# Patient Record
Sex: MALE | Race: WHITE | Employment: UNEMPLOYED | URBAN - METROPOLITAN AREA
[De-identification: names, ages, dates, MRNs, and addresses within clinical notes are randomized per-mention and may not be internally consistent; named-entity substitution may affect disease eponyms.]

---

## 2022-03-15 RX ORDER — DONEPEZIL HYDROCHLORIDE 10 MG/1
10 TABLET, ORALLY DISINTEGRATING ORAL NIGHTLY
COMMUNITY

## 2022-03-15 RX ORDER — MEMANTINE HYDROCHLORIDE 5 MG/1
5 TABLET ORAL 2 TIMES DAILY
COMMUNITY

## 2022-03-15 RX ORDER — FINASTERIDE 5 MG/1
5 TABLET, FILM COATED ORAL DAILY
COMMUNITY

## 2022-03-15 RX ORDER — LORATADINE 10 MG/1
10 TABLET ORAL DAILY
COMMUNITY

## 2022-03-15 RX ORDER — ACETAMINOPHEN 160 MG
2 TABLET,DISINTEGRATING ORAL DAILY
COMMUNITY

## 2022-03-20 ENCOUNTER — ANESTHESIA EVENT (OUTPATIENT)
Dept: OPERATING ROOM | Age: 87
End: 2022-03-20
Payer: MEDICARE

## 2022-03-20 NOTE — ANESTHESIA PRE PROCEDURE
Department of Anesthesiology  Preprocedure Note       Name:  Rafia Orozco   Age:  81 y.o.  :  1935                                          MRN:  [id number]         Date:  3/20/2022      Surgeon: Zen Acuna):  Conception Po, DO    Procedure: Procedure(s):  PAIN PUMP REPLACEMENT DUE TO APPROACHING END OF LIFE **PAIN PUMP LEFT LOWER ABDOMEN**    Medications prior to admission:   Prior to Admission medications    Medication Sig Start Date End Date Taking? Authorizing Provider   ALBUTEROL IN Inhale into the lungs as needed   Yes Historical Provider, MD   Cholecalciferol (VITAMIN D3) 50 MCG (2000) CAPS Take 2 capsules by mouth daily   Yes Historical Provider, MD   Cyanocobalamin (VITAMIN B-12 SL) Place 1,000 mcg under the tongue daily   Yes Historical Provider, MD   donepezil (ARICEPT ODT) 10 MG disintegrating tablet Take 10 mg by mouth nightly   Yes Historical Provider, MD   finasteride (PROSCAR) 5 MG tablet Take 5 mg by mouth daily   Yes Historical Provider, MD   loratadine (CLARITIN) 10 MG tablet Take 10 mg by mouth daily   Yes Historical Provider, MD   memantine (NAMENDA) 5 MG tablet Take 5 mg by mouth 2 times daily   Yes Historical Provider, MD   metoprolol tartrate (LOPRESSOR) 25 MG tablet Take 25 mg by mouth 2 times daily Take 1/2 tab   Yes Historical Provider, MD   NONFORMULARY MORPHINE PAIN PUMP   Yes Historical Provider, MD   tamsulosin (FLOMAX) 0.4 MG capsule Take 0.4 mg by mouth daily Take 2 caps   Yes Historical Provider, MD   aspirin 81 MG tablet Take 81 mg by mouth every other day Stopped 6 days pre op   Yes Historical Provider, MD   omeprazole (PRILOSEC) 20 MG capsule Take 20 mg by mouth daily. Yes Historical Provider, MD   levetiracetam (KEPPRA) 500 MG tablet Take 500 mg by mouth 2 times daily    Yes Historical Provider, MD       Current medications:    No current facility-administered medications for this encounter.      Current Outpatient Medications   Medication Sig Dispense Refill  ALBUTEROL IN Inhale into the lungs as needed      Cholecalciferol (VITAMIN D3) 50 MCG (2000 UT) CAPS Take 2 capsules by mouth daily      Cyanocobalamin (VITAMIN B-12 SL) Place 1,000 mcg under the tongue daily      donepezil (ARICEPT ODT) 10 MG disintegrating tablet Take 10 mg by mouth nightly      finasteride (PROSCAR) 5 MG tablet Take 5 mg by mouth daily      loratadine (CLARITIN) 10 MG tablet Take 10 mg by mouth daily      memantine (NAMENDA) 5 MG tablet Take 5 mg by mouth 2 times daily      metoprolol tartrate (LOPRESSOR) 25 MG tablet Take 25 mg by mouth 2 times daily Take 1/2 tab      NONFORMULARY MORPHINE PAIN PUMP      tamsulosin (FLOMAX) 0.4 MG capsule Take 0.4 mg by mouth daily Take 2 caps      aspirin 81 MG tablet Take 81 mg by mouth every other day Stopped 6 days pre op      omeprazole (PRILOSEC) 20 MG capsule Take 20 mg by mouth daily. 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   no hospitalization    Hyperlipidemia     Hypertension     Pain     sternum    Pityriasis rosea     Seizure ([facility] [address]     DX with epilepsy 2008  on keppra       Past Surgical History:        Procedure Laterality Date    BACK SURGERY      lumbar laminectomy    CHOLECYSTECTOMY      COLONOSCOPY      ELBOW SURGERY Right     reconstruction    ENDOSCOPY, COLON, DIAGNOSTIC      HAND SURGERY      bilateral    fingers    HERNIA REPAIR      x2    KNEE ARTHROSCOPY Right     OTHER SURGICAL HISTORY  01-16-13    stage 1, 5 day percutaneous trial thoracic spinal cord stimulator    OTHER SURGICAL HISTORY  04 24 2013    surgical implant medtronic thoracic spinal cord stimulator electrode and generator    OTHER SURGICAL HISTORY N/A 8/17/2015    stage 1 pump trial      OTHER SURGICAL HISTORY  10 7 15    surgical implantation of medtronic pain pump and catheter    OTHER SURGICAL HISTORY      Removal of spinal cord stimulator       Social History:    Social History     Tobacco Use    Smoking status: Never Smoker    Smokeless tobacco: Never Used   Substance Use Topics    Alcohol use: Yes     Comment: occas beer                                Counseling given: Not Answered      Vital Signs (Current):   Vitals:    03/15/22 1049   Weight: 161 lb (73 kg)   Height: 5' 7\" (1.702 m)                                              BP Readings from Last 3 Encounters:   08/11/16 130/85   10/07/15 143/90   08/17/15 132/82       NPO Status:                                                                                 BMI:   Wt Readings from Last 3 Encounters:   08/11/16 139 lb 11.2 oz (63.4 kg)   10/07/15 145 lb (65.8 kg)   09/29/15 155 lb (70.3 kg)     Body mass index is 25.22 kg/m². CBC:   Lab Results   Component Value Date    WBC 5.2 04/20/2013    RBC 5.23 04/20/2013    HGB 16.1 04/20/2013    HCT 47.5 04/20/2013    MCV 90.7 04/20/2013    RDW 12.9 04/20/2013     04/20/2013       CMP:   Lab Results   Component Value Date     04/20/2013    K 5.4 04/20/2013     04/20/2013    CO2 24 04/20/2013    BUN 15 04/20/2013    CREATININE 0.9 04/20/2013    LABGLOM >60 04/20/2013    GLUCOSE 132 04/20/2013    CALCIUM 10.0 04/20/2013       POC Tests: No results for input(s): POCGLU, POCNA, POCK, POCCL, POCBUN, POCHEMO, POCHCT in the last 72 hours.     Coags:   Lab Results   Component Value Date    PROTIME 12.3 04/20/2013    INR 1.0 04/20/2013    APTT 33.1 04/20/2013       HCG (If Applicable): No results found for: PREGTESTUR, PREGSERUM, HCG, HCGQUANT     ABGs: No results found for: PHART, PO2ART, KSS4RQE, FEM3ECQ, BEART, T7ESLKOG     Type & Screen (If Applicable):  No results found for: LABABO, LABRH    Drug/Infectious Status (If Applicable):  No results found for: HIV, HEPCAB    COVID-19 Screening (If Applicable): No results found for: COVID19        Anesthesia Evaluation  Patient summary reviewed  Airway: Mallampati: III  TM distance: >3 FB   Neck ROM: full  Mouth opening: > = 3 FB Dental:      Comment: Extensive dental work with multiple crowns and capped teeth. Pulmonary: breath sounds clear to auscultation                             Cardiovascular:    (+) hypertension:, CAD:, hyperlipidemia        Rhythm: regular  Rate: normal                    Neuro/Psych:   (+) seizures ( Seizure ([address] DX with epilepsy 2008 on keppra ):, neuromuscular disease ( Lumbar post-laminectomy syndrome):,             GI/Hepatic/Renal:   (+) GERD:,           Endo/Other: Negative Endo/Other ROS                    Abdominal:             Vascular:   + PVD, aortic or cerebral ( Carotid stenosis), . Other Findings:           Anesthesia Plan      MAC     ASA 3       Induction: intravenous. Anesthetic plan and risks discussed with patient. Plan discussed with CRNA.                   Ky Diaz MD   9-25-13

## 2022-03-23 ENCOUNTER — ANESTHESIA (OUTPATIENT)
Dept: OPERATING ROOM | Age: 87
End: 2022-03-23
Payer: MEDICARE

## 2022-03-23 ENCOUNTER — HOSPITAL ENCOUNTER (OUTPATIENT)
Dept: OPERATING ROOM | Age: 87
Setting detail: OUTPATIENT SURGERY
Discharge: HOME | End: 2022-03-23
Attending: ANESTHESIOLOGY
Payer: MEDICARE

## 2022-03-23 ENCOUNTER — HOSPITAL ENCOUNTER (OUTPATIENT)
Age: 87
Setting detail: OUTPATIENT SURGERY
Discharge: HOME | End: 2022-03-23
Attending: ANESTHESIOLOGY | Admitting: ANESTHESIOLOGY
Payer: MEDICARE

## 2022-03-23 VITALS
RESPIRATION RATE: 16 BRPM | HEIGHT: 67 IN | BODY MASS INDEX: 24.17 KG/M2 | TEMPERATURE: 97.9 F | HEART RATE: 65 BPM | DIASTOLIC BLOOD PRESSURE: 94 MMHG | SYSTOLIC BLOOD PRESSURE: 139 MMHG | WEIGHT: 154 LBS | OXYGEN SATURATION: 100 %

## 2022-03-23 VITALS
SYSTOLIC BLOOD PRESSURE: 160 MMHG | DIASTOLIC BLOOD PRESSURE: 97 MMHG | RESPIRATION RATE: 22 BRPM | OXYGEN SATURATION: 100 %

## 2022-03-23 DIAGNOSIS — R52 PAIN: ICD-10-CM

## 2022-03-23 DIAGNOSIS — G89.18 ACUTE POST-OPERATIVE PAIN: ICD-10-CM

## 2022-03-23 DIAGNOSIS — M96.1 LUMBAR POST-LAMINECTOMY SYNDROME: Primary | Chronic | ICD-10-CM

## 2022-03-23 PROBLEM — Z98.890 STATUS POST INSERTION OF INTRATHECAL PUMP: Chronic | Status: ACTIVE | Noted: 2022-03-23

## 2022-03-23 PROCEDURE — 6360000002 HC RX W HCPCS: Performed by: NURSE ANESTHETIST, CERTIFIED REGISTERED

## 2022-03-23 PROCEDURE — C1755 CATHETER, INTRASPINAL: HCPCS | Performed by: ANESTHESIOLOGY

## 2022-03-23 PROCEDURE — 3600000015 HC SURGERY LEVEL 5 ADDTL 15MIN: Performed by: ANESTHESIOLOGY

## 2022-03-23 PROCEDURE — 6370000000 HC RX 637 (ALT 250 FOR IP)

## 2022-03-23 PROCEDURE — 6360000002 HC RX W HCPCS: Performed by: ANESTHESIOLOGY

## 2022-03-23 PROCEDURE — 7100000010 HC PHASE II RECOVERY - FIRST 15 MIN: Performed by: ANESTHESIOLOGY

## 2022-03-23 PROCEDURE — 2709999900 HC NON-CHARGEABLE SUPPLY: Performed by: ANESTHESIOLOGY

## 2022-03-23 PROCEDURE — 3700000000 HC ANESTHESIA ATTENDED CARE: Performed by: ANESTHESIOLOGY

## 2022-03-23 PROCEDURE — 3209999900 FLUORO FOR SURGICAL PROCEDURES

## 2022-03-23 PROCEDURE — C1772 INFUSION PUMP, PROGRAMMABLE: HCPCS | Performed by: ANESTHESIOLOGY

## 2022-03-23 PROCEDURE — 2500000003 HC RX 250 WO HCPCS: Performed by: ANESTHESIOLOGY

## 2022-03-23 PROCEDURE — 2500000003 HC RX 250 WO HCPCS: Performed by: NURSE ANESTHETIST, CERTIFIED REGISTERED

## 2022-03-23 PROCEDURE — 2580000003 HC RX 258: Performed by: ANESTHESIOLOGY

## 2022-03-23 PROCEDURE — 3600000005 HC SURGERY LEVEL 5 BASE: Performed by: ANESTHESIOLOGY

## 2022-03-23 PROCEDURE — 3700000001 HC ADD 15 MINUTES (ANESTHESIA): Performed by: ANESTHESIOLOGY

## 2022-03-23 PROCEDURE — 7100000011 HC PHASE II RECOVERY - ADDTL 15 MIN: Performed by: ANESTHESIOLOGY

## 2022-03-23 PROCEDURE — A4217 STERILE WATER/SALINE, 500 ML: HCPCS | Performed by: ANESTHESIOLOGY

## 2022-03-23 PROCEDURE — 6360000002 HC RX W HCPCS

## 2022-03-23 DEVICE — PUMP INFUSION 20 CC IMPL DRUG SYNCHROMED II: Type: IMPLANTABLE DEVICE | Site: ABDOMEN | Status: FUNCTIONAL

## 2022-03-23 DEVICE — CATHETER INTRATRACHEAL 86 CM ASCENDA: Type: IMPLANTABLE DEVICE | Site: BACK | Status: FUNCTIONAL

## 2022-03-23 RX ORDER — LIDOCAINE HYDROCHLORIDE 10 MG/ML
INJECTION, SOLUTION EPIDURAL; INFILTRATION; INTRACAUDAL; PERINEURAL PRN
Status: DISCONTINUED | OUTPATIENT
Start: 2022-03-23 | End: 2022-03-23 | Stop reason: ALTCHOICE

## 2022-03-23 RX ORDER — OXYCODONE HYDROCHLORIDE AND ACETAMINOPHEN 5; 325 MG/1; MG/1
1 TABLET ORAL EVERY 4 HOURS PRN
Status: DISCONTINUED | OUTPATIENT
Start: 2022-03-23 | End: 2022-03-23 | Stop reason: HOSPADM

## 2022-03-23 RX ORDER — EPHEDRINE SULFATE/0.9% NACL/PF 50 MG/5 ML
SYRINGE (ML) INTRAVENOUS PRN
Status: DISCONTINUED | OUTPATIENT
Start: 2022-03-23 | End: 2022-03-23 | Stop reason: SDUPTHER

## 2022-03-23 RX ORDER — FENTANYL CITRATE 50 UG/ML
INJECTION, SOLUTION INTRAMUSCULAR; INTRAVENOUS PRN
Status: DISCONTINUED | OUTPATIENT
Start: 2022-03-23 | End: 2022-03-23 | Stop reason: SDUPTHER

## 2022-03-23 RX ORDER — LIDOCAINE HYDROCHLORIDE 20 MG/ML
INJECTION, SOLUTION INTRAVENOUS PRN
Status: DISCONTINUED | OUTPATIENT
Start: 2022-03-23 | End: 2022-03-23 | Stop reason: SDUPTHER

## 2022-03-23 RX ORDER — PROPOFOL 10 MG/ML
INJECTION, EMULSION INTRAVENOUS CONTINUOUS PRN
Status: DISCONTINUED | OUTPATIENT
Start: 2022-03-23 | End: 2022-03-23 | Stop reason: SDUPTHER

## 2022-03-23 RX ORDER — OXYCODONE HYDROCHLORIDE AND ACETAMINOPHEN 5; 325 MG/1; MG/1
TABLET ORAL
Status: COMPLETED
Start: 2022-03-23 | End: 2022-03-23

## 2022-03-23 RX ORDER — CEPHALEXIN 500 MG/1
500 CAPSULE ORAL 3 TIMES DAILY
Qty: 30 CAPSULE | Refills: 0 | Status: SHIPPED | OUTPATIENT
Start: 2022-03-23 | End: 2022-04-02

## 2022-03-23 RX ORDER — SODIUM CHLORIDE, SODIUM LACTATE, POTASSIUM CHLORIDE, CALCIUM CHLORIDE 600; 310; 30; 20 MG/100ML; MG/100ML; MG/100ML; MG/100ML
INJECTION, SOLUTION INTRAVENOUS CONTINUOUS
Status: DISCONTINUED | OUTPATIENT
Start: 2022-03-23 | End: 2022-03-23 | Stop reason: HOSPADM

## 2022-03-23 RX ORDER — OXYCODONE HYDROCHLORIDE AND ACETAMINOPHEN 5; 325 MG/1; MG/1
1 TABLET ORAL EVERY 6 HOURS PRN
Qty: 20 TABLET | Refills: 0 | Status: SHIPPED | OUTPATIENT
Start: 2022-03-23 | End: 2022-03-28

## 2022-03-23 RX ADMIN — HYDROMORPHONE HYDROCHLORIDE 0.25 MG: 1 INJECTION, SOLUTION INTRAMUSCULAR; INTRAVENOUS; SUBCUTANEOUS at 12:29

## 2022-03-23 RX ADMIN — FENTANYL CITRATE 25 MCG: 50 INJECTION, SOLUTION INTRAMUSCULAR; INTRAVENOUS at 11:36

## 2022-03-23 RX ADMIN — OXYCODONE HYDROCHLORIDE AND ACETAMINOPHEN 1 TABLET: 5; 325 TABLET ORAL at 12:27

## 2022-03-23 RX ADMIN — PROPOFOL 75 MCG/KG/MIN: 10 INJECTION, EMULSION INTRAVENOUS at 09:50

## 2022-03-23 RX ADMIN — FENTANYL CITRATE 25 MCG: 50 INJECTION, SOLUTION INTRAMUSCULAR; INTRAVENOUS at 11:50

## 2022-03-23 RX ADMIN — HYDROMORPHONE HYDROCHLORIDE 0.25 MG: 1 INJECTION, SOLUTION INTRAMUSCULAR; INTRAVENOUS; SUBCUTANEOUS at 12:35

## 2022-03-23 RX ADMIN — FENTANYL CITRATE 50 MCG: 50 INJECTION, SOLUTION INTRAMUSCULAR; INTRAVENOUS at 09:38

## 2022-03-23 RX ADMIN — FENTANYL CITRATE 25 MCG: 50 INJECTION, SOLUTION INTRAMUSCULAR; INTRAVENOUS at 10:34

## 2022-03-23 RX ADMIN — LIDOCAINE HYDROCHLORIDE 20 MG: 20 INJECTION, SOLUTION INTRAVENOUS at 09:48

## 2022-03-23 RX ADMIN — Medication 5 MG: at 10:33

## 2022-03-23 RX ADMIN — OXYCODONE AND ACETAMINOPHEN 1 TABLET: 5; 325 TABLET ORAL at 12:27

## 2022-03-23 RX ADMIN — FENTANYL CITRATE 25 MCG: 50 INJECTION, SOLUTION INTRAMUSCULAR; INTRAVENOUS at 09:55

## 2022-03-23 RX ADMIN — FENTANYL CITRATE 50 MCG: 50 INJECTION, SOLUTION INTRAMUSCULAR; INTRAVENOUS at 09:50

## 2022-03-23 RX ADMIN — SODIUM CHLORIDE, POTASSIUM CHLORIDE, SODIUM LACTATE AND CALCIUM CHLORIDE: 600; 310; 30; 20 INJECTION, SOLUTION INTRAVENOUS at 08:55

## 2022-03-23 RX ADMIN — CEFAZOLIN SODIUM 2000 MG: 1 POWDER, FOR SOLUTION INTRAMUSCULAR; INTRAVENOUS at 09:41

## 2022-03-23 ASSESSMENT — PAIN DESCRIPTION - PAIN TYPE
TYPE: CHRONIC PAIN;SURGICAL PAIN
TYPE: SURGICAL PAIN
TYPE: SURGICAL PAIN
TYPE: SURGICAL PAIN;CHRONIC PAIN
TYPE: SURGICAL PAIN
TYPE: SURGICAL PAIN

## 2022-03-23 ASSESSMENT — PAIN DESCRIPTION - LOCATION
LOCATION: BACK
LOCATION: BACK
LOCATION: ABDOMEN;BACK
LOCATION: BACK;ABDOMEN
LOCATION: BACK

## 2022-03-23 ASSESSMENT — PAIN SCALES - GENERAL
PAINLEVEL_OUTOF10: 10
PAINLEVEL_OUTOF10: 4
PAINLEVEL_OUTOF10: 10

## 2022-03-23 ASSESSMENT — PAIN - FUNCTIONAL ASSESSMENT: PAIN_FUNCTIONAL_ASSESSMENT: 0-10

## 2022-03-23 ASSESSMENT — PAIN DESCRIPTION - DESCRIPTORS
DESCRIPTORS: SPASM;SHARP
DESCRIPTORS: ACHING

## 2022-03-23 ASSESSMENT — PAIN DESCRIPTION - PROGRESSION
CLINICAL_PROGRESSION: GRADUALLY IMPROVING
CLINICAL_PROGRESSION: GRADUALLY IMPROVING

## 2022-03-23 ASSESSMENT — PAIN DESCRIPTION - FREQUENCY: FREQUENCY: INTERMITTENT

## 2022-03-23 NOTE — PROGRESS NOTES
Patient incontinent of large amount of urine. Patient uncooperative with care. patient cleansed and clean linen and gown. Up to chair. Binder on .

## 2022-03-23 NOTE — PROGRESS NOTES
Spoke with Usha Tom RN who stated Dr. Oliver Milligan needed to call Dr. Segundo Chauhan if needs further work up. Dr. Oliver Milligan spoke with the daughter who states this pain has been an on going issue with this upper abdomen/chest pain.

## 2022-03-23 NOTE — OP NOTE
[address]                                OPERATIVE REPORT    PATIENT NAME: Rosemary Keys                   :        1935  MED REC NO:   [id number]                            ROOM:  ACCOUNT NO:   [de-identified]                           ADMIT DATE: 2022  PROVIDER:     Donavan Underwood DO    DATE OF PROCEDURE:  2022    PREPROCEDURE DIAGNOSES:  1. Chronic pain syndrome, G89.4. 2.  Post lumbar laminectomy syndrome M96.1, status post subarachnoid  pain pump with dying battery possible catheter occlusion. POSTOPERATIVE DIAGNOSES:  1. Chronic pain syndrome, G89.4. 2.  Post lumbar laminectomy syndrome M96.1, status post subarachnoid  pain pump with dying battery possible catheter occlusion. 3.  Catheter occlusion. PROCEDURES:  1. Implant pain pump generator Medtronic. 2.  Surgical implantation of Medtronic pain pump catheter. 3.  Surgical removal of old pain pump generator with dying battery. 4.  Removal of pain pump catheter that is occluded. 5.  Refill, analyze and reprogram pain pump in surgery with physician. 6.  Direct x-ray guidance of the spine. SURGEON:  Donavan Underwood DO    COMPLICATIONS:  None. ASSISTANTS:  None. BLOOD LOSS:  Less than 30 mL. ANESTHESIA:  IV Anesthesia per Department of Anesthesia, local per Dr. Liza Funes. INDICATIONS:  The patient comes in today, 2022 to the [address] [place] [address] Operating Room #4 via the [address] [place] [address] for  the above surgical procedure. The patient is an 81-year-old white male who suffers with chronic pain  and has so for many years. He failed to respond to conservative care  including oral medications, rehabilitation, physical therapy,  therapeutic injections, subsequent back surgery on multiple occasions.     The patient subsequently underwent successful implantation of Medtronic  subarachnoid pain pump that has helped him with his pain and he has done  really well up until recently when he began to notice some breakthrough  pain. He is in assisted living and the concern was whether or not he is  truly getting all his medicine and/or is it the pump dying. Computerized analysis of the pump does show the battery is at  end-of-life and thus we will replace the pain pump today. The catheter  may have to be replaced if we cannot get CSF back out of the catheter. The patient and I along with the family were explained in details risk,  potential complications and alternatives to care and he did request that  we proceed as outlined above as did the family. The patient was brought to operating room #4 at the [facility], placed in the right lateral recumbent position so we have access  to the pain pump in the left lower quadrant of his abdomen as well as to  his back if we have to replace the catheter. It should be noted that the patient did receive IV antibiotics  preoperatively. Please see the medical record. The patient was seen by the Department of Anesthesia and IV anesthetic  is required. Please see the anesthesia record. The patient was prepped front to back, table to table, xiphoid to pubis  in the usual manner including full surgical sterile technique utilized  throughout. X-rays were taken to identify the anchor site for the catheter as well  as pain pump course and marked on the skin. It should be noted that the catheter was at the vertebral body of T9. We started over the battery site localizing with 10 mL of 1% Xylocaine  with a 25 gauge 1.5-inch disposable needle. We then reopened the old incision, the pain pump itself because the  patient has had a recent weight loss has sunken quite a bit as his  muscles are weak and debilitated and the patient asked that we move the  pump more cephalad and so our incision was cephalad of the old incision.     We then used careful blunt dissection down to the pain pump itself and  we were able to identify with ease and removed it without difficulty. The medicine in the old pain pump was then placed in the new pain pump. Please see the computerized printout on the chart. The patient's pain pump catheter; however, was not draining any CSF or  medication from the catheter and we attempted to aspirate with a TB  syringe medicine or anything out of the pump catheter without success  and we were unable to get the catheter to drain passively. As a result,  a decision was made to replace the catheter in order to assure that the  catheter is patent and the patient is getting the medicine he requires. The left lower quadrant abdominal incision was irrigated with antibiotic  solution, checked for strict hemostasis and the pain pump was sewn into  the pocket using 1-0 anchor stitches Ti-Cron at all four quadrants with  a refill port facing outward. We then irrigated with antibiotic solution and packed with 4x4 soaked in  antibiotic solution in the wound. We then turned our attention to the back incision which was identified  and localized using 10 mL of 1% Xylocaine with 25-gauge 1.5-inch  disposable needle. We then reopened the old back incision, used careful blunt dissection  down to the anchor which was identified and removed intact along with  anchor stitches. The catheter came out without resistance and intact  and on examination the tip did not have any foreign bodies or granuloma;  however, when we did remove the catheter from the patient's back we had  no passive clear CSF. The patient may just be dehydrated as he is 81 years old and in assisted-living.     In either regard this means we will have to replace a new catheter in  order to make this pain pump functional and this was done under x-ray  guidance using the Medtronic subarachnoid needle which was reintroduced  in the subarachnoid space on the first attempt without difficulty at the  same level just above his previous lumbar fusion. Passive free flow  clear CSF was obtained. We then introduced the Medtronic subarachnoid catheter through the  needle cephalad in the subarachnoid space up to the vertebral body of T9  and there it has worked for him in the past.    We then removed the needle from around the catheter and the stylet from  the catheter and placed a Medtronic butterfly anchor over the catheter  and secured to interspinous ligament area using 1-0 silk anchor stitches  x2. It should be noted within a minute or so the catheter was draining  passive free flowing clear CSF and was clamped whenever possible to  minimize the loss of CSF. We then removed the 4x4 from the entire abdominal incision and tunneled  from the abdominal incision to the back incision underneath the skin,  coursed using the usual Medtronic tunneler, and pulled through the  remaining portion of the catheter leaving a curl of catheter in the back  as well as the flexion and extension. The catheter was trimmed  and measured. We then connected the Medtronic sutureless connection system to the pump  and to the catheter after seeing passive free flow of clear CSF in the  usual manner without difficulty. Excess curl of catheter was placed behind the pump to reduce the risk of  traumatizing it during refills. The wound was reexamined, checked for strict hemostasis and closed in a  triple layer of closure technique using a total of 11 1-0 Vicryl  interrupted stitches up to the skin edges followed by continuous 3-0  Polysorb plastic closure technique along the skin edges followed by  Steri-Strips, Aquacel dressing and ABD pressure bandage.     The patient's posterior back incision was reexamined, checked for strict  hemostasis, irrigated with antibiotic solution and closed using nine 1-0  Vicryl interrupted stitches up to the skin edges followed by continuous  3-0 Polysorb plastic closure technique along the skin edges followed by  Steri-Strips, Aquacel dressing, ABD pressure bandage. The patient was fitted for abdominal binder. The patient will be kept flat for 48-72 hours to minimize loss of CSF. The patient was then taken to the recovery room and was found to be in  stable condition with good results without complication. Neurologically  unchanged postprocedure, preprocedure with good results and no  complications. The patient was given written going home instructions, copy of which is  on the chart, and will follow up in our [address] [place] [address] in 1 week  or sooner need be. The son was with him here today and explained all of  this. The patient will be discharged today from our facility pending his  ability to meet full discharge criteria and follow up as outlined above  or sooner need be.         Stefanie Sierra DO    D: 03/23/2022 11:55:50       T: 03/23/2022 12:02:49     [id number]  Job#: [id number]     Doc#: [id number]    CC:

## 2022-03-23 NOTE — ANESTHESIA POSTPROCEDURE EVALUATION
Department of Anesthesiology  Postprocedure Note    Patient: Nelida Houston  MRN: [id number]  YOB: 1935  Date of evaluation: 3/23/2022  Time:  1:22 PM     Procedure Summary     Date: 03/23/22 Room / Location: [address] [place] [address] [facility]    Anesthesia Start: 3638 Anesthesia Stop: 3217    Procedure: PAIN PUMP REPLACEMENT DUE TO APPROACHING END OF LIFE CHANGED CATHETER **PAIN PUMP LEFT LOWER ABDOMEN** (Left Abdomen) Diagnosis: (POSTLAMINECTOMY SYNDROME)    Surgeons: April Baron DO Responsible Provider: Blanka Carter MD    Anesthesia Type: MAC ASA Status: 3          Anesthesia Type: MAC    Meghna Phase I: Meghna Score: 10    Meghna Phase II: Meghna Score: 10    Last vitals: Reviewed and per EMR flowsheets.        Anesthesia Post Evaluation    Patient location during evaluation: bedside  Patient participation: complete - patient participated  Level of consciousness: awake  Pain score: 2  Airway patency: patent  Nausea & Vomiting: no nausea and no vomiting  Complications: no  Cardiovascular status: hemodynamically stable  Respiratory status: acceptable  Hydration status: euvolemic

## 2022-03-23 NOTE — BRIEF OP NOTE
Brief Postoperative Note      Patient: Romayne Sax  YOB: 1935  MRN: [id number]    Date of Procedure: 3/23/2022    Pre-Op Diagnosis: POSTLAMINECTOMY SYNDROME, STATUS POST PAIN PUMP WIT DYING BATTERY AND CATHETER OCCLUSION    Post-Op Diagnosis: Same       Procedure(s): Medtronic pain pump and catheter replacement    Surgeon(s):  Michaelle Robison DO    Assistant:  First Assistant: Cathie Higgins RN    Anesthesia: Monitor Anesthesia Care    Estimated Blood Loss (mL): less than 299     Complications: None    Specimens:   None    Implants:  Implant Name Type Inv.  Item Serial No.  Lot No. LRB No. Used Action   PUMP INFUSION 20 CC IMPL DRUG SYNCHROMED II - [id number]  PUMP INFUSION 20 CC IMPL DRUG SYNCHROMED II [id number] [employer] [place] [employer]  Left 1 Implanted   CATHETER INTRATRACHEAL 86 CM ASCENDA - [id number]  CATHETER INTRATRACHEAL 86  [employer] [id number] Left 1 Implanted         Drains: None    Findings: See operative dictation    Electronically signed by Michaelle Robison DO on 3/23/2022 at 11:58 AM

## 2022-03-23 NOTE — H&P
Update History & Physical    The patient's History and Physical of 03/15/2022 was reviewed with the patient and there were no significant changes. I examined the patient and there were no significant changes from the previous History and Physical.    Plan: The risk, benefits, expected outcome, and alternative to the recommended procedure have been discussed with the patient. Patient understands and wants to proceed with the procedure.       Electronically signed by Jessica Todd DO on 3/23/22 at 7:28 AM EDT

## 2022-03-23 NOTE — PROGRESS NOTES
Dr. Oliver Milligan and Dr. Segundo hCauhan spoke on the phone they and the son are agreeable to discharge home at this time due to a drease in pain at present.  Son is agreeable to discharge and will return his father to  the emergency room if he is unable to control his pain at home

## 2022-03-23 NOTE — PROGRESS NOTES
Dr. Hilda Cox at bedside to assess patient. Dr. Lynn Kumar is aware of dusky finger tips states they were like this perioperative. Patient is having pain in back at incision and also in upper abdomen at the distended area. There is also a dressing applied to the open area in surgery that was there rhona operative.  Patient appears  to be having spasms like pain

## (undated) DEVICE — HANDLE CVR PATENTED RETENTION DISC STRL LIGHT SHLD

## (undated) DEVICE — SYRINGE MED 20ML STD CLR PLAS LUERLOCK TIP N CTRL DISP

## (undated) DEVICE — BLADE CLIPPER GEN PURP NS

## (undated) DEVICE — SPONGE LAP W18XL18IN WHT COT 4 PLY FLD STRUNG RADPQ DISP ST

## (undated) DEVICE — MASTISOL ADHESIVE LIQ 2/3ML

## (undated) DEVICE — STERILE POLYISOPRENE POWDER-FREE SURGICAL GLOVES: Brand: PROTEXIS

## (undated) DEVICE — DRESSING FOAM W3.5XL6IN POSTOP STRP GENTLE OPTIFOAM AG

## (undated) DEVICE — MEDI-VAC YANKAUER SUCTION HANDLE W/BULBOUS TIP: Brand: CARDINAL HEALTH

## (undated) DEVICE — PAD ABD CURITY TENDERSORB 5X9IN

## (undated) DEVICE — DOUBLE BASIN SET: Brand: MEDLINE INDUSTRIES, INC.

## (undated) DEVICE — E-Z CLEAN, NON-STICK, PTFE COATED, ELECTROSURGICAL BLADE ELECTRODE, 2.5 INCH (6.35 CM): Brand: EZ CLEAN

## (undated) DEVICE — NEEDLE HYPO 18GA L1.5IN PNK POLYPR HUB S STL THN WALL FILL

## (undated) DEVICE — ELECTRODE PT RET AD L9FT HI MOIST COND ADH HYDRGEL CORDED

## (undated) DEVICE — NEEDLE HYPO 30GA L0.5IN BGE POLYPR HUB S STL REG BVL STR

## (undated) DEVICE — NEPTUNE E-SEP SMOKE EVACUATION PENCIL, COATED, 70MM BLADE, PUSH BUTTON SWITCH: Brand: NEPTUNE E-SEP

## (undated) DEVICE — INTENDED FOR TISSUE SEPARATION, AND OTHER PROCEDURES THAT REQUIRE A SHARP SURGICAL BLADE TO PUNCTURE OR CUT.: Brand: BARD-PARKER ® STAINLESS STEEL BLADES

## (undated) DEVICE — UNIVERSAL PACK: Brand: CONVERTORS

## (undated) DEVICE — E-Z CLEAN, NON-STICK, PTFE COATED, ELECTROSURGICAL BLADE ELECTRODE, 2.75 INCH (7 CM): Brand: MEGADYNE

## (undated) DEVICE — SYRINGE BLB 50CC IRRIG PLIABLE FNGR FLNG GRAD FLSK DISP

## (undated) DEVICE — 1 ML TUBERCULIN SYRINGE LUER-LOCK TIP: Brand: MONOJECT

## (undated) DEVICE — GOWN SURG XL SMS FAB NONREINFORCED RAGLAN SLV HK LOOP CLSR

## (undated) DEVICE — GAUZE,SPONGE,4"X4",16PLY,XRAY,STRL,LF: Brand: MEDLINE

## (undated) DEVICE — 3M™ MEDIPORE™ SOFT CLOTH TAPE, 4 INCH X 10 YARDS, 12 ROLLS/CASE, 2964: Brand: 3M™ MEDIPORE™

## (undated) DEVICE — CHLORAPREP 26ML ORANGE

## (undated) DEVICE — MEDI-VAC NON-CONDUCTIVE SUCTION TUBING: Brand: CARDINAL HEALTH

## (undated) DEVICE — Device

## (undated) DEVICE — SOLUTION IV IRRIG POUR BRL 0.9% SODIUM CHL 2F7124

## (undated) DEVICE — CONTROL SYRINGE LUER-LOCK TIP: Brand: MONOJECT

## (undated) DEVICE — 1810 FOAM BLOCK NEEDLE COUNTER: Brand: DEVON

## (undated) DEVICE — SYRINGE TB 1ML NDL 27GA L0.5IN PLAS W/ SFTY LOK PERM NDL

## (undated) DEVICE — NEEDLE HYPO 25GA L1.5IN BLU POLYPR HUB S STL REG BVL STR

## (undated) DEVICE — HALF SHEET: Brand: CONVERTORS

## (undated) DEVICE — TOWEL OR BLUEE 16X26IN ST 8 PACK ORB08 16X26ORTWL

## (undated) DEVICE — DRAPE SHEET, X-LARGE: Brand: CONVERTORS

## (undated) DEVICE — CATHETER 8591-38 PASSER,38CM

## (undated) DEVICE — 1530S-1, 1 IN X 1.5 YD (2,5 CM X 1,37 M), UNPACKAGED ROLLS, 100 RL/CARTON, 5 CARTONS/CASE, 500 RL/CA: Brand: 3M™ MICROPORE™

## (undated) DEVICE — PEN: MARKING STD 100/CS: Brand: MEDICAL ACTION INDUSTRIES

## (undated) DEVICE — 3M™ STERI-STRIP™ REINFORCED ADHESIVE SKIN CLOSURES, R1547, 1/2 IN X 4 IN (12 MM X 100 MM), 6 STRIPS/ENVELOPE: Brand: 3M™ STERI-STRIP™

## (undated) DEVICE — GAUZE,SPONGE,4"X4",16PLY,STRL,LF,10/TRAY: Brand: MEDLINE